# Patient Record
Sex: FEMALE | Race: WHITE | Employment: OTHER | URBAN - METROPOLITAN AREA
[De-identification: names, ages, dates, MRNs, and addresses within clinical notes are randomized per-mention and may not be internally consistent; named-entity substitution may affect disease eponyms.]

---

## 2022-07-18 ENCOUNTER — TELEPHONE (OUTPATIENT)
Dept: FAMILY MEDICINE | Facility: CLINIC | Age: 42
End: 2022-07-18
Payer: MEDICAID

## 2022-07-18 NOTE — TELEPHONE ENCOUNTER
Informed patient that we are not taking any more medicaid until next year. We can schedule her or we can refer [facility]. Patient verbalized understanding.

## 2022-07-18 NOTE — TELEPHONE ENCOUNTER
Informed patient that we are no longer taking medicaid and she can call [facility] to get an appointment sooner. Patient stated that her insurance said that we are accepting it and I told her that we do not deal with insurance. Then she asked who scheduled out appointments and I said we do sometimes and management and then she hung up

## 2022-07-18 NOTE — TELEPHONE ENCOUNTER
----- Message from Eve Quiles sent at 7/18/2022 12:44 PM CDT -----  Regarding: Same Day Appointment  Contact: patient  Type:  Same Day Appointment Request    Caller is requesting a same day appointment.  Caller declined first available appointment listed below.    Name of Caller:Sangeeta   When is the first available appointment? 12/2022  Symptoms: medication   Best Call Back Number: [phone] (home)     Additional Information:The caller stated that she needs her medication     ThanksYOLANDA